# Patient Record
Sex: FEMALE | Race: WHITE | NOT HISPANIC OR LATINO | Employment: OTHER | URBAN - METROPOLITAN AREA
[De-identification: names, ages, dates, MRNs, and addresses within clinical notes are randomized per-mention and may not be internally consistent; named-entity substitution may affect disease eponyms.]

---

## 2023-01-23 ENCOUNTER — OFFICE VISIT (OUTPATIENT)
Dept: FAMILY MEDICINE CLINIC | Facility: CLINIC | Age: 73
End: 2023-01-23
Payer: MEDICARE

## 2023-01-23 VITALS
SYSTOLIC BLOOD PRESSURE: 118 MMHG | RESPIRATION RATE: 18 BRPM | WEIGHT: 142 LBS | HEART RATE: 64 BPM | OXYGEN SATURATION: 96 % | DIASTOLIC BLOOD PRESSURE: 64 MMHG

## 2023-01-23 DIAGNOSIS — N89.8 VAGINAL DISCHARGE: ICD-10-CM

## 2023-01-23 DIAGNOSIS — N64.4 BREAST PAIN, LEFT: ICD-10-CM

## 2023-01-23 DIAGNOSIS — F33.41 RECURRENT MAJOR DEPRESSIVE DISORDER, IN PARTIAL REMISSION: Primary | ICD-10-CM

## 2023-01-23 PROBLEM — F32.9 MAJOR DEPRESSIVE DISORDER: Status: ACTIVE | Noted: 2023-01-23

## 2023-01-23 PROCEDURE — 91312 COVID-19 (PFIZER) BIVALENT BOOSTER 12+YRS: CPT | Performed by: NURSE PRACTITIONER

## 2023-01-23 PROCEDURE — 99204 OFFICE O/P NEW MOD 45 MIN: CPT | Performed by: NURSE PRACTITIONER

## 2023-01-23 PROCEDURE — 0124A PR ADM SARSCOV2 30MCG/0.3ML BST: CPT | Performed by: NURSE PRACTITIONER

## 2023-01-23 RX ORDER — ESCITALOPRAM OXALATE 10 MG/1
TABLET ORAL
COMMUNITY
Start: 1993-01-19

## 2023-01-23 RX ORDER — LAMOTRIGINE 150 MG/1
TABLET ORAL
COMMUNITY
Start: 2023-01-19

## 2023-01-23 RX ORDER — NAPROXEN 500 MG/1
500 TABLET ORAL
COMMUNITY
Start: 2022-11-07

## 2023-01-23 NOTE — PROGRESS NOTES
Mike Winter is a 72 y.o. female.     History of Present Illness   New patient here to establish care. She is new to this provider and practice.  Patient recently moved from [place].  She is living with her partner.    Acute on chronic sharp pains to R breast over the last 2 years.  Sharp pain will come from under her armpit across to her nipple.. 2 years ago she has ultrasound and she has had mammogram last in September 2022. + family hx of breast cancer, mom dx at 80 yoa.  She reports that no one has done a breast exam and would like this done today.  She denies fever, chills, night sweats, weight loss, skin changes or nipple changes or discharge.  No notable lymphadenopathy.    Acute vaginal discharge, intermittent X 2 months. No odor, no blood, no new sex partners, no itching. Had transvaginal ultrasound a that was negative and has upcoming GYN appt  March 14.  Negative STI panel, negative BV and candidiasis swab previously done.    Chronic depression/anxiety x years. Still sees psych from [place] every 6 months via telehealth.  She would like to find a local provider to cover these medications.. She has been on lexapro 10 mg and lamictal 150mg. She denies SI.HI.  She denies history of bipolar depression, denies history of gisell.  She has some interrupted sleep and anxiety with IBS/gas at night.  She uses CBD for sleep. She manages IBS w probiotic and diet.  Patient reports her moods have been stable on this medication combo for greater than 6 years.    The following portions of the patient's history were reviewed and updated as appropriate: allergies, current medications, past family history, past medical history, past social history, past surgical history and problem list.    Review of Systems    Objective   Physical Exam  Vitals reviewed.   HENT:      Head: Normocephalic.      Mouth/Throat:      Mouth: Mucous membranes are moist.   Eyes:      Pupils: Pupils are equal, round, and reactive to light.    Cardiovascular:      Rate and Rhythm: Normal rate and regular rhythm.      Pulses: Normal pulses.      Heart sounds: Normal heart sounds.   Pulmonary:      Effort: Pulmonary effort is normal.      Breath sounds: Normal breath sounds.   Chest:      Chest wall: No mass, swelling or edema.   Breasts:     Right: Normal. No swelling, bleeding, inverted nipple, mass, nipple discharge, skin change or tenderness.      Left: Normal. No swelling, bleeding, inverted nipple, mass, nipple discharge, skin change or tenderness.      Comments: Patient declined chaperone during breast exam.  Abdominal:      General: Bowel sounds are normal.      Palpations: Abdomen is soft.   Musculoskeletal:         General: Normal range of motion.   Lymphadenopathy:      Upper Body:      Right upper body: No supraclavicular, axillary or pectoral adenopathy.      Left upper body: No supraclavicular, axillary or pectoral adenopathy.   Skin:     General: Skin is warm and dry.   Neurological:      General: No focal deficit present.      Mental Status: She is alert.   Psychiatric:         Mood and Affect: Mood is depressed.         Vitals:    01/23/23 1456   BP: 118/64   Pulse: 64   Resp: 18   SpO2: 96%     There is no height or weight on file to calculate BMI.    Procedures    Assessment & Plan   Problems Addressed this Visit        Mental Health    Major depressive disorder - Primary    Relevant Medications    escitalopram (LEXAPRO) 10 MG tablet   Diagnoses       Codes Comments    Recurrent major depressive disorder, in partial remission (HCC)    -  Primary ICD-10-CM: F33.41  ICD-9-CM: 296.35         Major depressive disorder- refer to behavioral health [facility], continue with Lamictal and Lexapro.  Encourage heart healthy diet, walking and water intake.    Breast pain- normal mammogram, normal breast exam today, practice gentle stretching, may use topical rubs for pain, follow-up with mammogram this year    Vaginal discharge-patient has  had work-up and labs/swabs done.  Follow-up as ordered with GYN         Education provided in AVS   Return if symptoms worsen or fail to improve, for Recheck.

## 2023-02-02 ENCOUNTER — TELEPHONE (OUTPATIENT)
Dept: OBSTETRICS AND GYNECOLOGY | Facility: CLINIC | Age: 73
End: 2023-02-02
Payer: MEDICARE

## 2023-02-02 ENCOUNTER — OFFICE VISIT (OUTPATIENT)
Dept: FAMILY MEDICINE CLINIC | Facility: CLINIC | Age: 73
End: 2023-02-02
Payer: MEDICARE

## 2023-02-02 VITALS
SYSTOLIC BLOOD PRESSURE: 126 MMHG | WEIGHT: 140 LBS | OXYGEN SATURATION: 97 % | HEIGHT: 67 IN | BODY MASS INDEX: 21.97 KG/M2 | DIASTOLIC BLOOD PRESSURE: 62 MMHG | HEART RATE: 82 BPM

## 2023-02-02 DIAGNOSIS — Z87.898 HISTORY OF URINE COLOR CHANGES: ICD-10-CM

## 2023-02-02 DIAGNOSIS — N95.0 POST-MENOPAUSAL BLEEDING: Primary | ICD-10-CM

## 2023-02-02 DIAGNOSIS — R31.9 HEMATURIA, UNSPECIFIED TYPE: ICD-10-CM

## 2023-02-02 LAB
BILIRUB BLD-MCNC: NEGATIVE MG/DL
CLARITY, POC: CLEAR
COLOR UR: YELLOW
GLUCOSE UR STRIP-MCNC: NEGATIVE MG/DL
KETONES UR QL: NEGATIVE
LEUKOCYTE EST, POC: NEGATIVE
NITRITE UR-MCNC: NEGATIVE MG/ML
PH UR: 6 [PH] (ref 5–8)
PROT UR STRIP-MCNC: ABNORMAL MG/DL
RBC # UR STRIP: ABNORMAL /UL
SP GR UR: 1 (ref 1–1.03)
UROBILINOGEN UR QL: NORMAL

## 2023-02-02 PROCEDURE — 81002 URINALYSIS NONAUTO W/O SCOPE: CPT | Performed by: NURSE PRACTITIONER

## 2023-02-02 PROCEDURE — 99213 OFFICE O/P EST LOW 20 MIN: CPT | Performed by: NURSE PRACTITIONER

## 2023-02-02 NOTE — PROGRESS NOTES
"Mike Winter is a 72 y.o. female.     History of Present Illness   Established pt.     Here for some postmenopausal spotting on and off since Novemeber. She saw provider with [facility] and and had transvaginal ultrasound and vaginal swabs done 12/2022 . Endometrial lining 2mm.She has upcoming Gyn appt with [facility] in March. She is asking if we could get her in with Gyn sooner. She is still having light brown discharge that comes and goes every 3 weeks or so, only noted with wiping. She did insert qtip into vagina and scant blood noted. Family hx + uterine ca in mother. She denies weight loss, night sweats, blaoting, pelvic pain or fullness.  No new sexual partners, lives w same sex partner. Has had negative sti panel. No recent sex, no douching, nothing new in soaps, detergents. No sprays or powders.   Some \"green tint\" or darkness in urine. No fever, No odor, no dysuria.     The following portions of the patient's history were reviewed and updated as appropriate: allergies, current medications, past family history, past medical history, past social history, past surgical history and problem list.    Review of Systems    Objective   Physical Exam  Vitals reviewed.   HENT:      Mouth/Throat:      Mouth: Mucous membranes are moist.   Cardiovascular:      Rate and Rhythm: Normal rate and regular rhythm.      Pulses: Normal pulses.      Heart sounds: Normal heart sounds.   Pulmonary:      Effort: Pulmonary effort is normal.      Breath sounds: Normal breath sounds.   Abdominal:      General: Abdomen is flat. Bowel sounds are normal.      Palpations: Abdomen is soft. There is no mass.      Tenderness: There is no abdominal tenderness. There is no right CVA tenderness or left CVA tenderness.   Skin:     General: Skin is warm.   Neurological:      General: No focal deficit present.      Mental Status: She is alert.         Vitals:    02/02/23 0934   BP: 126/62   Pulse: 82   SpO2: 97%     Body mass index is 21.93 " kg/m².    Procedures    Assessment & Plan   Problems Addressed this Visit    None  Visit Diagnoses     Post-menopausal bleeding    -  Primary    Relevant Orders    Ambulatory Referral to Gynecology    Urine Culture - Urine, Urine, Clean Catch    History of urine color changes        Hematuria, unspecified type        Relevant Orders    Urine Culture - Urine, Urine, Clean Catch    POCT urinalysis dipstick, manual (Completed)      Diagnoses       Codes Comments    Post-menopausal bleeding    -  Primary ICD-10-CM: N95.0  ICD-9-CM: 627.1     History of urine color changes     ICD-10-CM: Z87.898  ICD-9-CM: V13.89     Hematuria, unspecified type     ICD-10-CM: R31.9  ICD-9-CM: 599.70         Postmenopausal spotting-refer GYN for soonest appt    Urinary changes - check ua/cx, +hematuria on UA  Reviewed w pt not to insert anything into vagina, will not treat for UTi, will send for cx       Education provided in AVS   No follow-ups on file.

## 2023-02-02 NOTE — TELEPHONE ENCOUNTER
L/m for pt to call to schedule New Gy appt for PMB.    She was referred to Dr. Snyder-pt may schedule in his first available New Gy appt on 2/27/23, or with any provider of her choice (first available New Gy appt).    Pt # [phone]

## 2023-02-06 LAB
BACTERIA UR CULT: ABNORMAL
BACTERIA UR CULT: ABNORMAL
OTHER ANTIBIOTIC SUSC ISLT: ABNORMAL

## 2023-02-08 RX ORDER — CIPROFLOXACIN 500 MG/1
500 TABLET, FILM COATED ORAL 2 TIMES DAILY
Qty: 6 TABLET | Refills: 0 | Status: SHIPPED | OUTPATIENT
Start: 2023-02-08 | End: 2023-02-11

## 2023-04-14 ENCOUNTER — TELEPHONE (OUTPATIENT)
Dept: FAMILY MEDICINE CLINIC | Facility: CLINIC | Age: 73
End: 2023-04-14
Payer: MEDICARE

## 2023-04-14 NOTE — TELEPHONE ENCOUNTER
maxx and donald message regarding new patient paperwork for appointment with NAT[facility]ID[facility] Garcia. Advised pt to call with any questions or concerns.

## 2023-07-24 DIAGNOSIS — D22.9 ATYPICAL MOLE: Primary | ICD-10-CM

## 2023-07-28 DIAGNOSIS — K58.8 OTHER IRRITABLE BOWEL SYNDROME: Primary | ICD-10-CM

## 2023-07-28 DIAGNOSIS — K58.9 IRRITABLE BOWEL SYNDROME, UNSPECIFIED TYPE: Primary | ICD-10-CM

## 2023-10-10 DIAGNOSIS — Z12.31 BREAST CANCER SCREENING BY MAMMOGRAM: Primary | ICD-10-CM

## 2023-10-18 RX ORDER — ROSUVASTATIN CALCIUM 10 MG/1
10 TABLET, COATED ORAL DAILY
Qty: 90 TABLET | Refills: 1 | Status: SHIPPED | OUTPATIENT
Start: 2023-10-18

## 2023-11-07 ENCOUNTER — HOSPITAL ENCOUNTER (OUTPATIENT)
Dept: MAMMOGRAPHY | Facility: HOSPITAL | Age: 73
Discharge: HOME | End: 2023-11-07
Admitting: NURSE PRACTITIONER
Payer: MEDICARE

## 2023-11-07 DIAGNOSIS — Z12.31 BREAST CANCER SCREENING BY MAMMOGRAM: ICD-10-CM

## 2023-11-07 PROCEDURE — 77067 SCR MAMMO BI INCL CAD: CPT

## 2023-11-07 PROCEDURE — 77063 BREAST TOMOSYNTHESIS BI: CPT

## 2023-11-17 ENCOUNTER — TELEPHONE (OUTPATIENT)
Dept: FAMILY MEDICINE CLINIC | Facility: CLINIC | Age: 73
End: 2023-11-17

## 2023-11-17 NOTE — TELEPHONE ENCOUNTER
HUB TO READ:    We only have the most recent one in her chart. It still has not been read yet.

## 2023-11-17 NOTE — TELEPHONE ENCOUNTER
Caller: Montez Winter    Relationship to patient: Self    Best call back number: [phone]    Patient is needing: SHE WOULD LIKE FOR YOU TO SEND A COPY OF HER LAST 2 YEARS OF MAMMOGRAMS TO BE SENT TO [facility] ON [address]

## 2023-11-17 NOTE — TELEPHONE ENCOUNTER
Caller: Montez Winter    Relationship: Self    Best call back number: [phone]    What was the call regarding: PATIENT CALLING TO CHECK THE STATUS OF THIS REQUEST. PLEASE CALL PATIENT.    Is it okay if the provider responds through [facility]: NO

## 2024-04-14 DIAGNOSIS — E78.5 HYPERLIPIDEMIA, UNSPECIFIED HYPERLIPIDEMIA TYPE: Primary | ICD-10-CM

## 2024-04-15 RX ORDER — ROSUVASTATIN CALCIUM 10 MG/1
10 TABLET, COATED ORAL DAILY
Qty: 90 TABLET | Refills: 1 | Status: SHIPPED | OUTPATIENT
Start: 2024-04-15